# Patient Record
Sex: MALE | Race: WHITE | NOT HISPANIC OR LATINO | Employment: STUDENT | URBAN - METROPOLITAN AREA
[De-identification: names, ages, dates, MRNs, and addresses within clinical notes are randomized per-mention and may not be internally consistent; named-entity substitution may affect disease eponyms.]

---
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
## 2023-07-08 ENCOUNTER — OFFICE VISIT (OUTPATIENT)
Dept: URGENT CARE | Facility: URGENT CARE | Age: 23
End: 2023-07-08
Payer: COMMERCIAL

## 2023-07-08 VITALS
TEMPERATURE: 98.4 F | OXYGEN SATURATION: 98 % | SYSTOLIC BLOOD PRESSURE: 131 MMHG | HEART RATE: 78 BPM | WEIGHT: 171 LBS | DIASTOLIC BLOOD PRESSURE: 73 MMHG

## 2023-07-08 DIAGNOSIS — J01.90 ACUTE BACTERIAL SINUSITIS: Primary | ICD-10-CM

## 2023-07-08 DIAGNOSIS — B96.89 ACUTE BACTERIAL SINUSITIS: Primary | ICD-10-CM

## 2023-07-08 PROCEDURE — 99213 OFFICE O/P EST LOW 20 MIN: CPT | Performed by: STUDENT IN AN ORGANIZED HEALTH CARE EDUCATION/TRAINING PROGRAM

## 2023-07-08 RX ORDER — DOXYCYCLINE HYCLATE 100 MG
100 TABLET ORAL 2 TIMES DAILY
Qty: 10 TABLET | Refills: 0 | Status: SHIPPED | OUTPATIENT
Start: 2023-07-08

## 2023-07-08 NOTE — PROGRESS NOTES
Assessment & Plan     Acute bacterial sinusitis  Thuan is a 22 year old who presents to Urgent Care for acutely worsening cough and new headache after 1 month of symptoms. Discussed that his history and physical examination are most consistent with a bacterial rhinosinusitis and will plan for treatment with doxycycline BID for 5 days. He reports that he got a mild rash after receiving cefzil as a child so will treat with doxycycline.   - doxycycline hyclate (VIBRA-TABS) 100 MG tablet  Dispense: 10 tablet; Refill: 0     13 minutes spent by me on the date of the encounter doing chart review, history and exam, documentation and further activities per the note. Billed based on complexity.     No follow-ups on file.    Nicole Gonzalez MD  [facility]    Fang Larose is a 22 year old male who presents to clinic today for the following health issues:  Chief Complaint   Patient presents with     POSS SINUS INFECTION     ONSET 1 MONTH AGO     HPI    Cough for 1 month assumed allergies, works in [employer], headache, SOB    URI Adult    Onset of symptoms was 1 month(s) ago.  Course of illness is waxing and waning.    Severity mild  Current and Associated symptoms: runny nose, stuffy nose, cough - productive- mucus- green, shortness of breath, facial pain/pressure, headache, fatigue 2 days and inhalational sounds  Treatment measures tried include Antihistamine- Claritin and Rest.  Predisposing factors include seasonal allergies.    Review of Systems  Constitutional, HEENT, cardiovascular, pulmonary, gi and gu systems are negative, except as otherwise noted.      Objective    /73   Pulse 78   Temp 98.4  F (36.9  C)   Wt 77.6 kg (171 lb)   SpO2 98%   Physical Exam   GENERAL: healthy, alert and no distress  EYES: Eyes grossly normal to inspection, PERRL, EOMI and conjunctivae and sclerae normal  HENT: normal cephalic/atraumatic, both ears: clear effusion, nose and mouth without  ulcers or lesions, nasal mucosa edematous , rhinorrhea yellow and green, oropharynx clear, oral mucous membranes moist and tonsillar erythema, tenderness to frontal sinuses  NECK: no adenopathy, no asymmetry, masses, or scars and thyroid normal to palpation  RESP: no rales , no wheezes and inspiratory wheezes R mid posterior  CV: regular rate and rhythm, normal S1 S2, no S3 or S4, no murmur, click or rub, no peripheral edema and peripheral pulses strong  MS: no gross musculoskeletal defects noted, no edema  SKIN: no suspicious lesions or rashes    No results found for this or any previous visit (from the past 24 hour(s)).